# Patient Record
Sex: MALE | Race: ASIAN | NOT HISPANIC OR LATINO | URBAN - METROPOLITAN AREA
[De-identification: names, ages, dates, MRNs, and addresses within clinical notes are randomized per-mention and may not be internally consistent; named-entity substitution may affect disease eponyms.]

---

## 2020-11-03 ENCOUNTER — OFFICE VISIT (OUTPATIENT)
Dept: URBAN - METROPOLITAN AREA CLINIC 31 | Facility: CLINIC | Age: 70
End: 2020-11-03

## 2020-11-03 ENCOUNTER — TELEPHONE ENCOUNTER (OUTPATIENT)
Dept: URBAN - METROPOLITAN AREA CLINIC 35 | Facility: CLINIC | Age: 70
End: 2020-11-03

## 2020-11-03 VITALS
DIASTOLIC BLOOD PRESSURE: 62 MMHG | BODY MASS INDEX: 23.1 KG/M2 | SYSTOLIC BLOOD PRESSURE: 108 MMHG | HEIGHT: 74 IN | WEIGHT: 180 LBS

## 2020-11-03 RX ORDER — PANTOPRAZOLE SODIUM 40 MG/1
1 TABLET TABLET, DELAYED RELEASE ORAL ONCE A DAY
Qty: 30 | Status: ACTIVE | COMMUNITY

## 2020-11-03 RX ORDER — PANTOPRAZOLE SODIUM 40 MG/1
1 TABLET TABLET, DELAYED RELEASE ORAL ONCE A DAY
OUTPATIENT

## 2020-11-03 RX ORDER — ATORVASTATIN CALCIUM 10 MG/1
1 TABLET TABLET, FILM COATED ORAL ONCE A DAY
Qty: 30 | Status: ACTIVE | COMMUNITY

## 2020-11-03 RX ORDER — LEVOTHYROXINE SODIUM 0.07 MG/1
1 TABLET IN THE MORNING ON AN EMPTY STOMACH TABLET ORAL ONCE A DAY
Qty: 30 | Status: ACTIVE | COMMUNITY

## 2020-11-03 NOTE — HPI-MIGRATED HPI
Gastroesophageal Reflux : 71 y/o male who presents today for a consultation for a longterm history of GERD. Onset 30 years ago. Symptoms has been increased over the past 1-2 years, but more severe 2 months ago.  Eating late, spicy or acidic food has been an aggravating factor, which he has been avoiding over the past 2 months.   Symptoms are described as a "sour throat", esophageal irritation, burning sensation retrosternally.  In Sept. 2020 he had an episode of waking up from sleep choking and gasping for breath. He report same symptom 30 years ago and was treated with Zantac 1 QD x 2-3 years with relief. Occasionally will drink aloe vera water upon waking in the morning, but not sure if is helpful.  He had an ENT consultation with Dr. Jae Bourgeois on Sept. 8, 2020 noting vocal cords were not closing completely. Subsequently treated with Pantoprazole 40 mg 1 Qpm, advised lief style modification with eating small portion meals, decrease alcohol intake and voice therapy was recommended.  He has voice therapy once a week.  Last ENT follow up was on (10/6/2020) and US Thyroid with FNA was ordered. He had the US Thyroid, but was told thyroid nodule was to small to perform FNA.  EGD performed 30 years ago by Dr. Jerome with findings of GERD and Hpylori was negative (reported by patient). ;

## 2020-11-06 ENCOUNTER — OFFICE VISIT (OUTPATIENT)
Dept: URBAN - METROPOLITAN AREA SURGERY CENTER 8 | Facility: SURGERY CENTER | Age: 70
End: 2020-11-06

## 2020-11-17 ENCOUNTER — OFFICE VISIT (OUTPATIENT)
Dept: URBAN - METROPOLITAN AREA CLINIC 31 | Facility: CLINIC | Age: 70
End: 2020-11-17

## 2020-11-17 VITALS
HEART RATE: 60 BPM | HEIGHT: 74 IN | SYSTOLIC BLOOD PRESSURE: 112 MMHG | BODY MASS INDEX: 21.94 KG/M2 | WEIGHT: 171 LBS | OXYGEN SATURATION: 97 % | DIASTOLIC BLOOD PRESSURE: 78 MMHG

## 2020-11-17 RX ORDER — ATORVASTATIN CALCIUM 10 MG/1
1 TABLET TABLET, FILM COATED ORAL ONCE A DAY
Qty: 30 | Status: ACTIVE | COMMUNITY

## 2020-11-17 RX ORDER — PANTOPRAZOLE SODIUM 40 MG/1
1 TABLET TABLET, DELAYED RELEASE ORAL ONCE A DAY
Status: ACTIVE | COMMUNITY

## 2020-11-17 RX ORDER — LEVOTHYROXINE SODIUM 0.07 MG/1
1 TABLET IN THE MORNING ON AN EMPTY STOMACH TABLET ORAL ONCE A DAY
Qty: 30 | Status: ACTIVE | COMMUNITY

## 2020-11-17 RX ORDER — PANTOPRAZOLE SODIUM 20 MG/1
1 TABLET TABLET, DELAYED RELEASE ORAL ONCE A DAY
Qty: 90 TABLET | Refills: 1 | OUTPATIENT

## 2020-11-17 NOTE — HPI-MIGRATED HPI
Gastroesophageal Reflux : Patient presents today for a follow up of GERD and to reivew his EGD results. He denies any complications after his procedure. He admits/denies continued episodes of GERD since his last visit.   He reports the use of Pantoprazole 40 mg QD and it does control his symptoms. He states that in 1 week he will begin to taper off of this medication, he reports that he will use it every other day for 1 month, he will then stop taking it.    Last visit (11/3/2020) 69 y/o male who presents today for a consultation for a longterm history of GERD. Onset 30 years ago. Symptoms has been increased over the past 1-2 years, but more severe 2 months ago.  Eating late, spicy or acidic food has been an aggravating factor, which he has been avoiding over the past 2 months.   Symptoms are described as a "sour throat", esophageal irritation, burning sensation retrosternally.  In Sept. 2020 he had an episode of waking up from sleep choking and gasping for breath. He report same symptom 30 years ago and was treated with Zantac 1 QD x 2-3 years with relief. Occasionally will drink aloe vera water upon waking in the morning, but not sure if is helpful.  He had an ENT consultation with Dr. Jae Bourgeois on Sept. 8, 2020 noting vocal cords were not closing completely. Subsequently treated with Pantoprazole 40 mg 1 Qpm, advised lief style modification with eating small portion meals, decrease alcohol intake and voice therapy was recommended.  He has voice therapy once a week.  Last ENT follow up was on (10/6/2020) and US Thyroid with FNA was ordered. He had the US Thyroid, but was told thyroid nodule was to small to perform FNA.  EGD performed 30 years ago by Dr. Jerome with findings of GERD and Hpylori was negative (reported by patient). ;

## 2021-02-23 ENCOUNTER — OFFICE VISIT (OUTPATIENT)
Dept: URBAN - METROPOLITAN AREA CLINIC 31 | Facility: CLINIC | Age: 71
End: 2021-02-23

## 2022-10-19 ENCOUNTER — OFFICE VISIT (OUTPATIENT)
Dept: URBAN - METROPOLITAN AREA CLINIC 33 | Facility: CLINIC | Age: 72
End: 2022-10-19
Payer: COMMERCIAL

## 2022-10-19 VITALS
WEIGHT: 169 LBS | BODY MASS INDEX: 21.69 KG/M2 | HEART RATE: 74 BPM | OXYGEN SATURATION: 98 % | SYSTOLIC BLOOD PRESSURE: 122 MMHG | HEIGHT: 74 IN | DIASTOLIC BLOOD PRESSURE: 80 MMHG

## 2022-10-19 DIAGNOSIS — R12 HEARTBURN: ICD-10-CM

## 2022-10-19 DIAGNOSIS — Z86.010 PERSONAL HISTORY OF COLONIC POLYPS: ICD-10-CM

## 2022-10-19 DIAGNOSIS — K21.9 GASTRO-ESOPHAGEAL REFLUX DISEASE WITHOUT ESOPHAGITIS: ICD-10-CM

## 2022-10-19 PROCEDURE — 99214 OFFICE O/P EST MOD 30 MIN: CPT | Performed by: PHYSICIAN ASSISTANT

## 2022-10-19 RX ORDER — LEVOTHYROXINE SODIUM 0.07 MG/1
1 TABLET IN THE MORNING ON AN EMPTY STOMACH TABLET ORAL ONCE A DAY
Qty: 30 | Status: ACTIVE | COMMUNITY

## 2022-10-19 RX ORDER — PANTOPRAZOLE SODIUM 20 MG/1
1 TABLET TABLET, DELAYED RELEASE ORAL ONCE A DAY
Qty: 90 TABLET | Refills: 1 | Status: ACTIVE | COMMUNITY

## 2022-10-19 RX ORDER — PANTOPRAZOLE SODIUM 40 MG/1
1 TABLET TABLET, DELAYED RELEASE ORAL ONCE A DAY
Status: DISCONTINUED | COMMUNITY

## 2022-10-19 RX ORDER — SODIUM, POTASSIUM,MAG SULFATES 17.5-3.13G
177ML SOLUTION, RECONSTITUTED, ORAL ORAL
Qty: 1 | Refills: 0 | OUTPATIENT
Start: 2022-10-19 | End: 2022-10-21

## 2022-10-19 RX ORDER — PANTOPRAZOLE SODIUM 20 MG/1
1 TABLET TABLET, DELAYED RELEASE ORAL ONCE A DAY
Qty: 90 TABLET | Refills: 1 | OUTPATIENT

## 2022-10-19 RX ORDER — ATORVASTATIN CALCIUM 10 MG/1
1 TABLET TABLET, FILM COATED ORAL ONCE A DAY
Qty: 30 | Status: ACTIVE | COMMUNITY

## 2022-10-19 NOTE — HPI-SURVEILLANCE COLONOSCOPY
Patient presents today for surveillance colonoscopy consult. Patient states last colonoscopy was 6 years ago with findings of colon polyps. Patient admits a personal history of colon polyps. Patient denies a family history of colon polyps or colon cancer. Currently reports 1 bowel movement per day with occasional strain. His stools are formed without blood, mucus, or melena. He denies any episodes of rectal pain or pruritus ani.

## 2022-10-19 NOTE — HPI-GERD
Patient admits to history of GERD, and is on Pantoprazole 20mg daily with good control of symptoms. Prior EGD in 2020.

## 2022-10-20 PROBLEM — 428283002: Status: ACTIVE | Noted: 2022-10-19

## 2022-10-28 ENCOUNTER — OFFICE VISIT (OUTPATIENT)
Dept: URBAN - METROPOLITAN AREA SURGERY CENTER 8 | Facility: SURGERY CENTER | Age: 72
End: 2022-10-28
Payer: COMMERCIAL

## 2022-10-28 DIAGNOSIS — K63.89 BACTERIAL OVERGROWTH SYNDROME: ICD-10-CM

## 2022-10-28 DIAGNOSIS — Z12.11 COLON CANCER SCREENING: ICD-10-CM

## 2022-10-28 DIAGNOSIS — D12.2 ADENOMA OF ASCENDING COLON: ICD-10-CM

## 2022-10-28 DIAGNOSIS — Z86.010 ADENOMAS PERSONAL HISTORY OF COLONIC POLYPS: ICD-10-CM

## 2022-10-28 PROCEDURE — 45385 COLONOSCOPY W/LESION REMOVAL: CPT | Performed by: INTERNAL MEDICINE

## 2022-10-28 PROCEDURE — G8907 PT DOC NO EVENTS ON DISCHARG: HCPCS | Performed by: INTERNAL MEDICINE

## 2022-11-15 ENCOUNTER — OFFICE VISIT (OUTPATIENT)
Dept: URBAN - METROPOLITAN AREA CLINIC 35 | Facility: CLINIC | Age: 72
End: 2022-11-15
Payer: COMMERCIAL

## 2022-11-15 ENCOUNTER — DASHBOARD ENCOUNTERS (OUTPATIENT)
Age: 72
End: 2022-11-15

## 2022-11-15 VITALS
DIASTOLIC BLOOD PRESSURE: 78 MMHG | SYSTOLIC BLOOD PRESSURE: 120 MMHG | OXYGEN SATURATION: 98 % | WEIGHT: 171 LBS | HEART RATE: 78 BPM | HEIGHT: 74 IN | BODY MASS INDEX: 21.94 KG/M2

## 2022-11-15 DIAGNOSIS — K57.90 DIVERTICULOSIS: ICD-10-CM

## 2022-11-15 DIAGNOSIS — K64.0 GRADE I HEMORRHOIDS: ICD-10-CM

## 2022-11-15 DIAGNOSIS — R12 HEARTBURN: ICD-10-CM

## 2022-11-15 DIAGNOSIS — K21.9 GASTRO-ESOPHAGEAL REFLUX DISEASE WITHOUT ESOPHAGITIS: ICD-10-CM

## 2022-11-15 DIAGNOSIS — D12.3 BENIGN NEOPLASM OF TRANSVERSE COLON: ICD-10-CM

## 2022-11-15 DIAGNOSIS — D12.2 ADENOMA OF ASCENDING COLON: ICD-10-CM

## 2022-11-15 PROBLEM — 397881000: Status: ACTIVE | Noted: 2022-11-15

## 2022-11-15 PROBLEM — 266435005 GASTRO-ESOPHAGEAL REFLUX DISEASE WITHOUT ESOPHAGITIS: Status: ACTIVE | Noted: 2020-11-03

## 2022-11-15 PROCEDURE — 99214 OFFICE O/P EST MOD 30 MIN: CPT | Performed by: PHYSICIAN ASSISTANT

## 2022-11-15 RX ORDER — PANTOPRAZOLE SODIUM 20 MG/1
1 TABLET TABLET, DELAYED RELEASE ORAL ONCE A DAY
Qty: 90 TABLET | Refills: 1 | OUTPATIENT

## 2022-11-15 RX ORDER — LEVOTHYROXINE SODIUM 0.07 MG/1
1 TABLET IN THE MORNING ON AN EMPTY STOMACH TABLET ORAL ONCE A DAY
Qty: 30 | Status: ACTIVE | COMMUNITY

## 2022-11-15 RX ORDER — PANTOPRAZOLE SODIUM 20 MG/1
1 TABLET TABLET, DELAYED RELEASE ORAL ONCE A DAY
Qty: 90 TABLET | Refills: 1 | Status: ACTIVE | COMMUNITY

## 2022-11-15 RX ORDER — ATORVASTATIN CALCIUM 10 MG/1
1 TABLET TABLET, FILM COATED ORAL ONCE A DAY
Qty: 30 | Status: ACTIVE | COMMUNITY

## 2022-11-15 NOTE — HPI-COLONOSCOPY FOLLOWUP
72 year old male patient presents today for a follow up from his colonoscopy. He denies any complications after his procedure. He currently reports 1 bowel movement per day, without strain. His stools are formed. He adenies any mucus, melena or blood in stools. Denies any pruritus ani or rectal pain.   Colonoscopy report shows: - Internal hemorrhoids. - Diverticulosis in the sigmoid colon and in the descending colon. - One 5 mm polyp in the proximal ascending colon. - One 4 mm polyp in the proximal transverse colon. Colon, Ascending, prox, Clinically "Polyp", Biopsy -TUBULAR ADENOMA. Colon, Transverse, prox, Clinically "Polyp", Biopsy -COLONIC MUCOSA WITH NO SIGNIFICANT HISTOPATHOLOGY.

## 2025-07-01 ENCOUNTER — OFFICE VISIT (OUTPATIENT)
Dept: URBAN - METROPOLITAN AREA CLINIC 35 | Facility: CLINIC | Age: 75
End: 2025-07-01
Payer: COMMERCIAL

## 2025-07-01 DIAGNOSIS — R68.2 DRY MOUTH, UNSPECIFIED: ICD-10-CM

## 2025-07-01 DIAGNOSIS — K21.9 GASTRO-ESOPHAGEAL REFLUX DISEASE WITHOUT ESOPHAGITIS: ICD-10-CM

## 2025-07-01 DIAGNOSIS — H04.123 DRY EYE SYNDROME OF BILATERAL LACRIMAL GLANDS: ICD-10-CM

## 2025-07-01 DIAGNOSIS — Z86.0100 PERSONAL HISTORY OF COLONIC POLYPS: ICD-10-CM

## 2025-07-01 DIAGNOSIS — Z09 ENCOUNTER FOR FOLLOW-UP: ICD-10-CM

## 2025-07-01 PROBLEM — 123640000: Status: ACTIVE | Noted: 2025-07-01

## 2025-07-01 PROBLEM — 95766002: Status: ACTIVE | Noted: 2025-07-01

## 2025-07-01 PROBLEM — 428283002: Status: ACTIVE | Noted: 2025-07-01

## 2025-07-01 PROCEDURE — 99214 OFFICE O/P EST MOD 30 MIN: CPT | Performed by: INTERNAL MEDICINE

## 2025-07-01 PROCEDURE — 99204 OFFICE O/P NEW MOD 45 MIN: CPT | Performed by: INTERNAL MEDICINE

## 2025-07-01 RX ORDER — PANTOPRAZOLE SODIUM 20 MG/1
1 TABLET TABLET, DELAYED RELEASE ORAL ONCE A DAY
Qty: 90 TABLET | Refills: 1 | Status: ACTIVE | COMMUNITY

## 2025-07-01 RX ORDER — FAMOTIDINE 40 MG/1
1 TABLET TABLET, FILM COATED ORAL ONCE A DAY
Qty: 90 TABLET | Refills: 3 | OUTPATIENT
Start: 2025-07-01

## 2025-07-01 RX ORDER — LEVOTHYROXINE SODIUM 0.07 MG/1
1 TABLET IN THE MORNING ON AN EMPTY STOMACH TABLET ORAL ONCE A DAY
Qty: 30 | Status: ACTIVE | COMMUNITY

## 2025-07-01 RX ORDER — HYDROXYCHLOROQUINE SULFATE 200 MG/1
AS DIRECTED TABLET, FILM COATED ORAL
Status: ACTIVE | COMMUNITY

## 2025-07-01 RX ORDER — ATORVASTATIN CALCIUM 10 MG/1
1 TABLET TABLET, FILM COATED ORAL ONCE A DAY
Qty: 30 | Status: ACTIVE | COMMUNITY

## 2025-07-01 RX ORDER — CEVIMELINE HYDROCHLORIDE 30 MG/1
1 CAPSULE CAPSULE ORAL THREE TIMES A DAY
Status: ACTIVE | COMMUNITY

## 2025-07-01 NOTE — HPI-CHANGE IN BOWEL HABITS
Patient presents for change in bowel habits consult. Patient admits 1 bowel movements a day with semi-solid stools. Patient admits a change in his stool consistency to slightly more loose after recently starting hydroxychloroquine. Patient denies blood in stools, melena , or mucus. Patient admits previous colonoscopy.

## 2025-07-01 NOTE — HPI-GERD
Patient presents today for a follow-up office visit for heartburn. His last office visit was with Eunice in 2022. He admits taking Pantoprazole 20mg prn for heartburn with some relief of symptoms. However, admits heartburn has been worsening recently. Patient admits recent heartburn episodes. Admits heartburn episodes about 1-2 times per week. He admits regurgitation. Admits nocturnal symptoms. Admits sleeping more upright to help with symptoms. Patient admits other associated symptoms at this time such as abdominal pain. Admits occassional nausea but denies vomiting. Patient admits some unintentional weight loss over the years. Patient's last EGD was done on 11/06/2022 with Dr. Simeon Pierson.  Of note, patient has a history of dry mouth since approx 2013 where he feels he is not producing saliva and cannot swallow without drinking water.   Last visit (11/15/2022) Patient admits to history of GERD, and is on Pantoprazole 20mg daily with good control of symptoms. Prior EGD in 2020.

## 2025-07-01 NOTE — HPI-ABDOMINAL PAIN
Patient presents for abdominal pain consult. Patient describes symptoms as a dull pain.  Patient states symptoms are located on the left side of his mid-abdomen. Patient admits he believes the abdominal pain is related to his heartburn, as when his heartburn goes away his abdominal pain also subsides. Admits a long-standing, 8-9 years history, of a tightness sensation in abdomen.

## 2025-07-24 ENCOUNTER — TELEPHONE ENCOUNTER (OUTPATIENT)
Dept: URBAN - METROPOLITAN AREA CLINIC 35 | Facility: CLINIC | Age: 75
End: 2025-07-24

## 2025-08-15 ENCOUNTER — TELEPHONE ENCOUNTER (OUTPATIENT)
Dept: URBAN - METROPOLITAN AREA CLINIC 35 | Facility: CLINIC | Age: 75
End: 2025-08-15